# Patient Record
Sex: MALE | Race: ASIAN
[De-identification: names, ages, dates, MRNs, and addresses within clinical notes are randomized per-mention and may not be internally consistent; named-entity substitution may affect disease eponyms.]

---

## 2021-12-30 ENCOUNTER — HOSPITAL ENCOUNTER (EMERGENCY)
Dept: HOSPITAL 54 - ER | Age: 86
Discharge: HOME | End: 2021-12-30
Payer: MEDICARE

## 2021-12-30 VITALS — BODY MASS INDEX: 20.58 KG/M2 | HEIGHT: 61 IN | WEIGHT: 109 LBS

## 2021-12-30 VITALS — SYSTOLIC BLOOD PRESSURE: 131 MMHG | DIASTOLIC BLOOD PRESSURE: 61 MMHG
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
## 2021-12-30 DIAGNOSIS — Z04.1: Primary | ICD-10-CM
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
## 2021-12-30 NOTE — NUR
FAMILY AT BEDSIDE. ER MD SPEAKING TO THEM REGARDING DISCHARGE, PT LEFT WITH 
FAMILY. AMBULATED OUT OF ED WITH STEADY GAIT.
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
